# Patient Record
Sex: MALE | Race: WHITE | NOT HISPANIC OR LATINO | Employment: STUDENT | URBAN - METROPOLITAN AREA
[De-identification: names, ages, dates, MRNs, and addresses within clinical notes are randomized per-mention and may not be internally consistent; named-entity substitution may affect disease eponyms.]

---

## 2022-09-28 ENCOUNTER — TELEPHONE (OUTPATIENT)
Dept: FAMILY MEDICINE | Facility: CLINIC | Age: 22
End: 2022-09-28
Payer: COMMERCIAL

## 2022-09-28 NOTE — TELEPHONE ENCOUNTER
----- Message from Marshall Vines sent at 9/28/2022  2:43 PM CDT -----  Contact: Patient  The pt called and would like to schedule an appt    (Epic will not allow me to schedule for him)    The pt can be reached at [phone]

## 2022-09-30 ENCOUNTER — OFFICE VISIT (OUTPATIENT)
Dept: FAMILY MEDICINE | Facility: CLINIC | Age: 22
End: 2022-09-30
Payer: COMMERCIAL

## 2022-09-30 VITALS
HEART RATE: 90 BPM | HEIGHT: 74 IN | WEIGHT: 159.19 LBS | SYSTOLIC BLOOD PRESSURE: 116 MMHG | RESPIRATION RATE: 18 BRPM | OXYGEN SATURATION: 97 % | BODY MASS INDEX: 20.43 KG/M2 | DIASTOLIC BLOOD PRESSURE: 64 MMHG

## 2022-09-30 DIAGNOSIS — R41.840 ATTENTION AND CONCENTRATION DEFICIT: Primary | ICD-10-CM

## 2022-09-30 PROCEDURE — 99999 PR PBB SHADOW E&M-EST. PATIENT-LVL III: ICD-10-PCS | Mod: PBBFAC,,, | Performed by: FAMILY MEDICINE

## 2022-09-30 PROCEDURE — 3074F PR MOST RECENT SYSTOLIC BLOOD PRESSURE < 130 MM HG: ICD-10-PCS | Mod: CPTII,S$GLB,, | Performed by: FAMILY MEDICINE

## 2022-09-30 PROCEDURE — 1159F MED LIST DOCD IN RCRD: CPT | Mod: CPTII,S$GLB,, | Performed by: FAMILY MEDICINE

## 2022-09-30 PROCEDURE — 99214 OFFICE O/P EST MOD 30 MIN: CPT | Mod: S$GLB,,, | Performed by: FAMILY MEDICINE

## 2022-09-30 PROCEDURE — 3008F PR BODY MASS INDEX (BMI) DOCUMENTED: ICD-10-PCS | Mod: CPTII,S$GLB,, | Performed by: FAMILY MEDICINE

## 2022-09-30 PROCEDURE — 1160F PR REVIEW ALL MEDS BY PRESCRIBER/CLIN PHARMACIST DOCUMENTED: ICD-10-PCS | Mod: CPTII,S$GLB,, | Performed by: FAMILY MEDICINE

## 2022-09-30 PROCEDURE — 1159F PR MEDICATION LIST DOCUMENTED IN MEDICAL RECORD: ICD-10-PCS | Mod: CPTII,S$GLB,, | Performed by: FAMILY MEDICINE

## 2022-09-30 PROCEDURE — 3008F BODY MASS INDEX DOCD: CPT | Mod: CPTII,S$GLB,, | Performed by: FAMILY MEDICINE

## 2022-09-30 PROCEDURE — 3078F PR MOST RECENT DIASTOLIC BLOOD PRESSURE < 80 MM HG: ICD-10-PCS | Mod: CPTII,S$GLB,, | Performed by: FAMILY MEDICINE

## 2022-09-30 PROCEDURE — 3078F DIAST BP <80 MM HG: CPT | Mod: CPTII,S$GLB,, | Performed by: FAMILY MEDICINE

## 2022-09-30 PROCEDURE — 3074F SYST BP LT 130 MM HG: CPT | Mod: CPTII,S$GLB,, | Performed by: FAMILY MEDICINE

## 2022-09-30 PROCEDURE — 1160F RVW MEDS BY RX/DR IN RCRD: CPT | Mod: CPTII,S$GLB,, | Performed by: FAMILY MEDICINE

## 2022-09-30 PROCEDURE — 99999 PR PBB SHADOW E&M-EST. PATIENT-LVL III: CPT | Mod: PBBFAC,,, | Performed by: FAMILY MEDICINE

## 2022-09-30 PROCEDURE — 99214 PR OFFICE/OUTPT VISIT, EST, LEVL IV, 30-39 MIN: ICD-10-PCS | Mod: S$GLB,,, | Performed by: FAMILY MEDICINE

## 2022-09-30 NOTE — PROGRESS NOTES
"Subjective:       Patient ID: Bandar Smiley is a 21 y.o. male.    Chief Complaint: trouble focusing (Requests evaluation for ADD)    Pleasant 21-year-old patient who is a student [employer].  He is having attention deficit problems.  He has not been treated for ADHD but it certainly came close with his last physician.  He is from [place] and his physician is unable to do controlled prescriptions across state lines.  I would like him to have a formal evaluation for ADHD to be sure working with right diagnosis and then I will be happy to manage him from there.  Referral to Psychology displaced does not urgent request and hopefully will get that done soon.  He does see any other systemic problems going on looking at the chart and the patient I will see him back after the evaluation for ADHD is complete.    Review of Systems    Objective:      Vitals:    09/30/22 0813   BP: 116/64   Pulse: 90   Resp: 18   SpO2: 97%   Weight: 72.2 kg (159 lb 2.8 oz)   Height: 6' 2" (1.88 m)      Physical Exam  Vitals and nursing note reviewed.   Constitutional:       Appearance: Normal appearance. He is obese.   HENT:      Head: Normocephalic and atraumatic.      Nose: Nose normal.      Mouth/Throat:      Mouth: Mucous membranes are moist.      Pharynx: Oropharynx is clear.   Eyes:      Extraocular Movements: Extraocular movements intact.      Conjunctiva/sclera: Conjunctivae normal.      Pupils: Pupils are equal, round, and reactive to light.   Cardiovascular:      Rate and Rhythm: Normal rate and regular rhythm.   Pulmonary:      Effort: Pulmonary effort is normal.      Breath sounds: Normal breath sounds.   Abdominal:      General: Abdomen is flat. Bowel sounds are normal.      Palpations: Abdomen is soft.   Musculoskeletal:         General: Normal range of motion.      Cervical back: Normal range of motion and neck supple.   Skin:     General: Skin is warm and dry.      Capillary Refill: Capillary refill takes less than 2 seconds. "   Neurological:      General: No focal deficit present.      Mental Status: He is alert and oriented to person, place, and time.   Psychiatric:         Mood and Affect: Mood normal.         Behavior: Behavior normal.         Thought Content: Thought content normal.         Judgment: Judgment normal.       No results found for this or any previous visit.   Assessment:       1. Attention and concentration deficit        Plan:       Attention and concentration deficit  -     Ambulatory referral/consult to Psychology; Future; Expected date: 10/07/2022

## 2022-10-17 ENCOUNTER — TELEPHONE (OUTPATIENT)
Dept: PSYCHIATRY | Facility: CLINIC | Age: 22
End: 2022-10-17
Payer: COMMERCIAL

## 2022-10-17 NOTE — TELEPHONE ENCOUNTER
Called pt about scheduling no answer LVM (left clinic contact info)      ----- Message from Lidia Denise sent at 10/17/2022  3:13 PM CDT -----       Type: Patient Returning Call    Who Called: Patient   Who Left Message for Patient: NA    Does the patient know what this is regarding?: New patient with referral from Dhaval Villasenor MD in [facility] for Attention and concentration deficit [R41.490] is looking to schedule an appointment. Would like a call back to schedule.     Would the patient rather a call back or a response via [facility]?  Call  Best Call Back Number: [phone]  Additional Information:  Please assist, thank you!

## 2022-10-19 ENCOUNTER — TELEPHONE (OUTPATIENT)
Dept: PSYCHIATRY | Facility: CLINIC | Age: 22
End: 2022-10-19
Payer: COMMERCIAL

## 2022-10-20 ENCOUNTER — TELEPHONE (OUTPATIENT)
Dept: FAMILY MEDICINE | Facility: CLINIC | Age: 22
End: 2022-10-20
Payer: COMMERCIAL

## 2022-10-20 ENCOUNTER — TELEPHONE (OUTPATIENT)
Dept: PSYCHIATRY | Facility: CLINIC | Age: 22
End: 2022-10-20
Payer: COMMERCIAL

## 2022-10-20 NOTE — TELEPHONE ENCOUNTER
Pt.'s mother requests that Dr. Villasenor consider refilling pt.'s Adderall prior to his psychology appt. On 11/16/22.  She states that he could use the medication while in school. Assured her that provider will be notified of request.  She vu.

## 2022-10-20 NOTE — TELEPHONE ENCOUNTER
----- Message from Che Nj sent at 10/20/2022 10:30 AM CDT -----  Contact: Mom  Type: Patient Call Back         Who called: Mom         What is the request in detail: calling to request a call back; please advise        Best call back number: [phone] - Mom         Additional Information:            Thank You

## 2022-10-21 ENCOUNTER — DOCUMENTATION ONLY (OUTPATIENT)
Dept: FAMILY MEDICINE | Facility: CLINIC | Age: 22
End: 2022-10-21
Payer: COMMERCIAL

## 2022-10-26 ENCOUNTER — OFFICE VISIT (OUTPATIENT)
Dept: FAMILY MEDICINE | Facility: CLINIC | Age: 22
End: 2022-10-26
Payer: COMMERCIAL

## 2022-10-26 DIAGNOSIS — R41.840 ATTENTION AND CONCENTRATION DEFICIT: Primary | ICD-10-CM

## 2022-10-26 PROBLEM — M25.859 FEMORAL ACETABULAR IMPINGEMENT: Status: ACTIVE | Noted: 2021-07-14

## 2022-10-26 PROBLEM — S73.199A ACETABULAR LABRUM TEAR: Status: ACTIVE | Noted: 2021-07-14

## 2022-10-26 PROCEDURE — 1160F PR REVIEW ALL MEDS BY PRESCRIBER/CLIN PHARMACIST DOCUMENTED: ICD-10-PCS | Mod: CPTII,95,, | Performed by: FAMILY MEDICINE

## 2022-10-26 PROCEDURE — 99214 OFFICE O/P EST MOD 30 MIN: CPT | Mod: 95,,, | Performed by: FAMILY MEDICINE

## 2022-10-26 PROCEDURE — 99214 PR OFFICE/OUTPT VISIT, EST, LEVL IV, 30-39 MIN: ICD-10-PCS | Mod: 95,,, | Performed by: FAMILY MEDICINE

## 2022-10-26 PROCEDURE — 1159F PR MEDICATION LIST DOCUMENTED IN MEDICAL RECORD: ICD-10-PCS | Mod: CPTII,95,, | Performed by: FAMILY MEDICINE

## 2022-10-26 PROCEDURE — 1160F RVW MEDS BY RX/DR IN RCRD: CPT | Mod: CPTII,95,, | Performed by: FAMILY MEDICINE

## 2022-10-26 PROCEDURE — 1159F MED LIST DOCD IN RCRD: CPT | Mod: CPTII,95,, | Performed by: FAMILY MEDICINE

## 2022-10-26 RX ORDER — DEXTROAMPHETAMINE SACCHARATE, AMPHETAMINE ASPARTATE, DEXTROAMPHETAMINE SULFATE AND AMPHETAMINE SULFATE 5; 5; 5; 5 MG/1; MG/1; MG/1; MG/1
1 TABLET ORAL 2 TIMES DAILY
Qty: 60 TABLET | Refills: 0 | Status: SHIPPED | OUTPATIENT
Start: 2022-10-26 | End: 2022-11-16

## 2022-10-26 NOTE — PROGRESS NOTES
The patient location is: home  The chief complaint leading to consultation is: medication.    Visit type: audiovisual    Face to Face time with patient: 00  20 minutes of total time spent on the encounter, which includes face to face time and non-face to face time preparing to see the patient (eg, review of tests), Obtaining and/or reviewing separately obtained history, Documenting clinical information in the electronic or other health record, Independently interpreting results (not separately reported) and communicating results to the patient/family/caregiver, or Care coordination (not separately reported).         Each patient to whom he or she provides medical services by telemedicine is:  (1) informed of the relationship between the physician and patient and the respective role of any other health care provider with respect to management of the patient; and (2) notified that he or she may decline to receive medical services by telemedicine and may withdraw from such care at any time.    Notes:  21-year-old patient contacted me to talk about attention deficit issues.  He is having to take time off of school to orgasm cell.  He is also on the process of having a psychological evaluation for ADHD is a formal diagnosis and perhaps other things are going on.  He is not depressed and he is not suicidal.  He would like to get his life more order and therefore he feels as though a being on a sock this time it would be helpful at this time despite the fact that he is not in school.  In the 5th grade he is on records that he was on methylphenidate he could find no official diagnosis.  I do recommend to give it a try for a month at 20 mg of Adderall short-acting b.i.d. as needed for self care and home care.  He should report any increased anxiety T me or headache or irregular heart rate if that is detectable.    O:  He is alert oriented x3.  He is no apparent distress. I do not hear flight of ideas.  His mood and judgment  are normal.    Diagnoses and all orders for this visit:    Attention and concentration deficit  -     dextroamphetamine-amphetamine (ADDERALL) 20 mg tablet; Take 1 tablet by mouth 2 (two) times a day.     Answers submitted by the patient for this visit:  Review of Systems Questionnaire (Submitted on 10/26/2022)  activity change: No  unexpected weight change: No  neck pain: No  hearing loss: No  rhinorrhea: No  trouble swallowing: No  eye discharge: No  visual disturbance: No  chest tightness: No  wheezing: No  chest pain: No  palpitations: No  blood in stool: No  constipation: No  vomiting: No  diarrhea: No  polydipsia: No  polyuria: No  difficulty urinating: No  urgency: No  hematuria: No  joint swelling: No  arthralgias: No  headaches: No  weakness: No  confusion: No  dysphoric mood: No

## 2022-11-16 ENCOUNTER — OFFICE VISIT (OUTPATIENT)
Dept: PSYCHIATRY | Facility: CLINIC | Age: 22
End: 2022-11-16
Payer: COMMERCIAL

## 2022-11-16 VITALS
HEIGHT: 74 IN | WEIGHT: 159.31 LBS | SYSTOLIC BLOOD PRESSURE: 115 MMHG | BODY MASS INDEX: 20.44 KG/M2 | DIASTOLIC BLOOD PRESSURE: 76 MMHG | HEART RATE: 61 BPM

## 2022-11-16 DIAGNOSIS — F41.1 GENERALIZED ANXIETY DISORDER: ICD-10-CM

## 2022-11-16 DIAGNOSIS — F33.1 MODERATE EPISODE OF RECURRENT MAJOR DEPRESSIVE DISORDER: ICD-10-CM

## 2022-11-16 DIAGNOSIS — R41.840 ATTENTION AND CONCENTRATION DEFICIT: ICD-10-CM

## 2022-11-16 PROCEDURE — 3008F PR BODY MASS INDEX (BMI) DOCUMENTED: ICD-10-PCS | Mod: CPTII,S$GLB,, | Performed by: PSYCHOLOGIST

## 2022-11-16 PROCEDURE — 3074F PR MOST RECENT SYSTOLIC BLOOD PRESSURE < 130 MM HG: ICD-10-PCS | Mod: CPTII,S$GLB,, | Performed by: PSYCHOLOGIST

## 2022-11-16 PROCEDURE — 90792 PR PSYCHIATRIC DIAGNOSTIC EVALUATION W/MEDICAL SERVICES: ICD-10-PCS | Mod: S$GLB,,, | Performed by: PSYCHOLOGIST

## 2022-11-16 PROCEDURE — 3008F BODY MASS INDEX DOCD: CPT | Mod: CPTII,S$GLB,, | Performed by: PSYCHOLOGIST

## 2022-11-16 PROCEDURE — 99999 PR PBB SHADOW E&M-EST. PATIENT-LVL III: ICD-10-PCS | Mod: PBBFAC,,, | Performed by: PSYCHOLOGIST

## 2022-11-16 PROCEDURE — 3078F PR MOST RECENT DIASTOLIC BLOOD PRESSURE < 80 MM HG: ICD-10-PCS | Mod: CPTII,S$GLB,, | Performed by: PSYCHOLOGIST

## 2022-11-16 PROCEDURE — 1159F MED LIST DOCD IN RCRD: CPT | Mod: CPTII,S$GLB,, | Performed by: PSYCHOLOGIST

## 2022-11-16 PROCEDURE — 99999 PR PBB SHADOW E&M-EST. PATIENT-LVL III: CPT | Mod: PBBFAC,,, | Performed by: PSYCHOLOGIST

## 2022-11-16 PROCEDURE — 3078F DIAST BP <80 MM HG: CPT | Mod: CPTII,S$GLB,, | Performed by: PSYCHOLOGIST

## 2022-11-16 PROCEDURE — 1159F PR MEDICATION LIST DOCUMENTED IN MEDICAL RECORD: ICD-10-PCS | Mod: CPTII,S$GLB,, | Performed by: PSYCHOLOGIST

## 2022-11-16 PROCEDURE — 3074F SYST BP LT 130 MM HG: CPT | Mod: CPTII,S$GLB,, | Performed by: PSYCHOLOGIST

## 2022-11-16 PROCEDURE — 90792 PSYCH DIAG EVAL W/MED SRVCS: CPT | Mod: S$GLB,,, | Performed by: PSYCHOLOGIST

## 2022-11-16 RX ORDER — LISDEXAMFETAMINE DIMESYLATE 30 MG/1
30 CAPSULE ORAL DAILY PRN
COMMUNITY
Start: 2022-11-15

## 2022-11-16 NOTE — PROGRESS NOTES
"Outpatient Psychiatric Initial Visit  11/16/2022     ID:   Patient presents for an initial evaluation.      Reason for encounter: Referral from Dr. Villasenor     Chief Complaint: depression, panic, ADHD evaluation - here for second opinion    History of Presenting Illness:  Pt explained that he had a stressful childhood, in his estimation, as his parents were "overbearing" and put a lot of pressure on him to succeed academically. Pt said that he did well in elementary school but said that it was easy. Pt struggled more in 5th grade and was brought to pediatrician who started a short trial of Ritalin which was stopped quickly. Pt said that he was a nervous kid and in early teen years was depressed but "was in denial" about his emotions. Pt said that he told himself it was normal and ignored his symptoms.     Pt went to college and it was during COVID and so he was working online. Pt joined a fraternity and made some good friends. Pt was kicked out for academics. Pt said that he broke his leg which has required multiple surgeries to repair. 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 Pt is depressed, having relationship difficulties, and was recently started on and stopped Paxil. Pt was tested for ADHD and started on Adderall and most recently Vyvanse. Pt showed me CPT-3 results which positively supported inattention but no impulsivity or other issues. Pt is depressed and having regular panic attacks, with a recent ER visit. Pt here for a second opinion as to his care but already established with a psychiatrist and therapist. Pt withdrew for this semester and is unsure whether he will take a semester off next year.    Depression symptoms: numb, crying, no motivation, excessive THC use, decrease in academic functioning     Anxiety symptoms: Pt reported lifelong chronic worry. Pt described excessive, unproductive worry that is difficult to control. Pt explained that worrying about things " outside of locust of control and worst case scenarios and described this worry as ruminative consistent with generalized anxiety disorder. Pt is also having persistent panic attacks, one that led to an ER evaluation recently. Pt denied symptoms consistent with OCD, phobias, or social anxiety.     Shayna/Hypomania Symptoms: Pt denied current or history of related symptoms.    Psychosis Symptoms: Pt denied current or history of related symptoms.    Attention/Concentration Symptoms: Pt reports attention/concentration concerns consistent with ADHD. Pt explained that her symptoms were present before the age of 13 and are cause impairment in more than one setting. Pt recently tested and diagnosed with ADHD.    Disordered Eating/Body Image Concerns: Pt denied current or history of related symptoms.    Suicidal Ideation and Risk: Pt denied current or history of related symptoms.    Homicidal/Violent Ideation and Risk: Pt denied current or history of related symptoms.    Criminal History: Pt denied.    Prior Psychiatric Treatment/Hospitalizations: Pt denied.     Current psychiatric medication: Vyvanse 30mg Q D    Prior psychiatric medication trials: Adderall, Ritalin, Paxil, Wellbutrin, Tenex    Current Medical Conditions Per Chart Review:   Patient Active Problem List   Diagnosis    Acetabular labrum tear    Femoral acetabular impingement      Family Psychiatric History:  brother - ADHD; mother - depression    Alcohol Use: Pt reported moderate, variable alcohol use and denied a history of problematic drinking.    Tobacco and Drug Use: Pt said that he smokes very few cigarettes per day but smokes marijuana excessively daily. Pt denied other consistent drug use.    Social History:  Pt is single, never  with no children living with three roommates in [place] off-campus. Pt is a 3rd year student at [employer] but withdrew this semester. Pt is not exercising regularly. Pt drinks variably and moderately. Pt said that he smokes  marijuana daily and excessively and smokes minimal cigarettes daily. Pt denied other regular drug use but said that he has experimented.     Trauma history:  pt denied     Mental Status Exam      Physical Exam  Psychiatric:         Attention and Perception: Attention and perception normal.         Mood and Affect: Mood is anxious and depressed.         Speech: Speech normal.         Behavior: Behavior normal.         Thought Content: Thought content normal. Thought content is not paranoid or delusional. Thought content does not include homicidal or suicidal ideation. Thought content does not include homicidal or suicidal plan.         Judgment: Judgment normal.      Comments: General appearance:  casually groomed, casually dressed    Behavior:  calm, engaged    Demeanor:  pleasant, cooperative    Mood:  anxious, depressed    Affect:  nervous, sad, tearful    Speech:  regular rate, tone and volume    Thought Process:  linear and goal directed    Thought Content:  appropriate - absent of aggressive or self injurious thoughts, feelings or impulses    Insight into Current Situation:  fair    Judgement: fair   Expected Ability to Adhere to Treatment plan: good        Current Evaluation:  Nutritional Screening:  Considering the patient's height and weight, medications, medical history and preferences, should a referral be made to the dietitian? No  Vitals: most recent vitals signs, dated greater than 90 days prior to this appointment, were reviewed  General: age appropriate, well nourished, casually dressed, neatly groomed  MSK: muscle strength/tone : no tremor or abnormal movements. Gait/Station: no ataxic, steady    Clinical Assessment :     Pt struggling with uncontrolled depression and anxiety with panic. Pt has also been diagnosed with ADHD but would like a second opinion. My opinion is to treat the mood first. Pt already has a psychiatrist and counselor.     Diagnosis(es):   1) Major Depressive Disorder, recurrent,  moderate  2) Generalized Anxiety Disorder    Plan      Goal #1: Improve mood    Pt is here for a second opinion. I opined that he treat his mood with SSRI treatment (potentially Atarax PRN for panic), counseling, and cardio exercise. I also said that I could test him further to confirm/disconfirm an ADHD diagnosis. Pt will think about his options. Pt already connected to a psychiatrist and therapist.    Treatment plan and medication changes will be coordinated with PCP, Dr. Villasenor    This author reviewed limits to confidentiality and this author's collaboration with pt's physician. Pt indicated understanding and denied any questions.    Return to Clinic: PRN    -Call to report any worsening of symptoms or problems associated with medication  - Pt instructed to go to ER if thoughts of harming self or others arise   Spent 45 minutes face-to-face with patient during evaluation.    -Supportive therapy and psychoeducation provided  -R/B/SE's of medications discussed with the pt who expresses understanding and chooses to take medications as prescribed.   -Pt instructed to call clinic, 911 or go to nearest emergency room if sxs worsen or pt is in   crisis. The pt expresses understanding.

## 2023-11-17 ENCOUNTER — OFFICE VISIT (OUTPATIENT)
Dept: URGENT CARE | Facility: CLINIC | Age: 23
End: 2023-11-17
Payer: COMMERCIAL

## 2023-11-17 VITALS
BODY MASS INDEX: 20.53 KG/M2 | RESPIRATION RATE: 18 BRPM | HEART RATE: 85 BPM | HEIGHT: 74 IN | OXYGEN SATURATION: 99 % | TEMPERATURE: 99 F | DIASTOLIC BLOOD PRESSURE: 76 MMHG | SYSTOLIC BLOOD PRESSURE: 110 MMHG | WEIGHT: 160 LBS

## 2023-11-17 DIAGNOSIS — Z48.02 ENCOUNTER FOR REMOVAL OF SUTURES: Primary | ICD-10-CM

## 2023-11-17 DIAGNOSIS — R68.84 JAW PAIN, NON-TMJ: ICD-10-CM
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
## 2023-11-17 PROCEDURE — 99203 OFFICE O/P NEW LOW 30 MIN: CPT | Mod: S$GLB,,, | Performed by: NURSE PRACTITIONER

## 2023-11-17 PROCEDURE — 99203 PR OFFICE/OUTPT VISIT, NEW, LEVL III, 30-44 MIN: ICD-10-PCS | Mod: S$GLB,,, | Performed by: NURSE PRACTITIONER

## 2023-11-17 RX ORDER — CELECOXIB 100 MG/1
CAPSULE ORAL
COMMUNITY
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
## 2023-11-17 NOTE — PATIENT INSTRUCTIONS
What care is needed at home?   Talk to your doctor about how to care for your wound. Ask your doctor about:  When you should change your bandages  When you may take a bath or shower  When you may go back to your normal activities like work or driving  How to protect your cut from the sun  Be sure to wash your hands before and after touching your wound or dressing.  Protect the cut site from being reinjured.  Certain health problems like high blood sugar or long-term steroid use may affect healing. Make sure you take all drugs as ordered by your doctor.  Do not pull on or pick at the stitches or sticky tape.  Place suture removal patient instructions here.

## 2023-11-17 NOTE — LETTER
November 17, 2023      [facility] [address] [place] [address]  Phone: [phone]  Fax: [phone]       Patient: Bandar Smiley   YOB: 2000  Date of Visit: 11/17/2023    To Whom It May Concern:    Nora Smiley  was at [facility] on 11/17/2023. The patient may return to work/school on 11/18/2023 with no restrictions. If you have any questions or concerns, or if I can be of further assistance, please do not hesitate to contact me.    Sincerely,          Chantel Melvin NP

## 2024-07-09 ENCOUNTER — PATIENT MESSAGE (OUTPATIENT)
Dept: ADMINISTRATIVE | Facility: HOSPITAL | Age: 24
End: 2024-07-09
Payer: COMMERCIAL

## 2025-08-19 ENCOUNTER — PATIENT MESSAGE (OUTPATIENT)
Dept: ADMINISTRATIVE | Facility: HOSPITAL | Age: 25
End: 2025-08-19
Payer: COMMERCIAL